# Patient Record
Sex: MALE | Race: BLACK OR AFRICAN AMERICAN | NOT HISPANIC OR LATINO
[De-identification: names, ages, dates, MRNs, and addresses within clinical notes are randomized per-mention and may not be internally consistent; named-entity substitution may affect disease eponyms.]

---

## 2021-07-30 ENCOUNTER — APPOINTMENT (OUTPATIENT)
Dept: ENDOCRINOLOGY | Facility: CLINIC | Age: 46
End: 2021-07-30
Payer: COMMERCIAL

## 2021-07-30 VITALS
TEMPERATURE: 98.3 F | SYSTOLIC BLOOD PRESSURE: 147 MMHG | HEART RATE: 72 BPM | WEIGHT: 250 LBS | HEIGHT: 68.5 IN | DIASTOLIC BLOOD PRESSURE: 91 MMHG | OXYGEN SATURATION: 98 % | BODY MASS INDEX: 37.46 KG/M2

## 2021-07-30 PROCEDURE — 99205 OFFICE O/P NEW HI 60 MIN: CPT

## 2021-08-01 NOTE — REASON FOR VISIT
1. Ice and elevate foot 15 minutes every 1-2 hours as needed for pain and swelling.  2. Follow-up with primary care provider if still difficulty with walking on it in 1 week.     [Initial Evaluation] : an initial evaluation [Pituitary Evaluation/ Disorder] : pituitary evaluation/disorder [FreeTextEntry2] : Dr. Aden

## 2021-08-01 NOTE — PHYSICAL EXAM
[Alert] : alert [Well Nourished] : well nourished [Obese] : obese [No Acute Distress] : no acute distress [Well Developed] : well developed [Normal Sclera/Conjunctiva] : normal sclera/conjunctiva [EOMI] : extra ocular movement intact [Visual Fields Grossly Intact] : visual fields grossly intact [No Proptosis] : no proptosis [Normal Oropharynx] : the oropharynx was normal [No Thyroid Nodules] : no palpable thyroid nodules [Well Healed Scar] : well healed scar [No Respiratory Distress] : no respiratory distress [No Accessory Muscle Use] : no accessory muscle use [Clear to Auscultation] : lungs were clear to auscultation bilaterally [Normal S1, S2] : normal S1 and S2 [Normal Rate] : heart rate was normal [Regular Rhythm] : with a regular rhythm [No Edema] : no peripheral edema [Pedal Pulses Normal] : the pedal pulses are present [Gynecomastia] : gynecomastia present [Normal Bowel Sounds] : normal bowel sounds [Not Tender] : non-tender [Not Distended] : not distended [Soft] : abdomen soft [No Spinal Tenderness] : no spinal tenderness [Spine Straight] : spine straight [No Stigmata of Cushings Syndrome] : no stigmata of Cushings Syndrome [Normal Gait] : normal gait [Normal Strength/Tone] : muscle strength and tone were normal [No Rash] : no rash [Acanthosis Nigricans] : no acanthosis nigricans [Normal Reflexes] : deep tendon reflexes were 2+ and symmetric [No Tremors] : no tremors [Oriented x3] : oriented to person, place, and time [de-identified] : large right sided mass approx 80g [de-identified] : b/l galactorrhea

## 2021-08-01 NOTE — HISTORY OF PRESENT ILLNESS
[FreeTextEntry1] : 46 year old sent in for evaluation of pituitary adenoma. Him and his wife have being trying to conceive for almost 3 years now. He was sent to Urology for evaluation. Prolactin was elevated and he was sent for an MRI. MRI shows 1.9 cm x 2.3 cm x 2.0 cm sellar/pituitary mass. He denies any issues with libido or ED. He has not fathered any children previously by choice. He states he did see an Endocrinologist as a child but does not know why. Reports normal development. Denies any blurry vision or visual disturbances. \par He also reports that in 2000s he has a total thyroidectomy due to a large thyroid in [facility]. Denies any cancer. He denies having any other treatment for his thyroid. He has never been on thyroid hormone. \par He reports that he was given medication for hypertension and does not take it regularly. He takes it only sometimes, does not have the name of the medication. \par \par \par

## 2021-08-01 NOTE — REVIEW OF SYSTEMS
[Negative] : Heme/Lymph [All other systems negative] : All other systems negative [Erectile Dysfunction] : no erectile dysfunction [Decreased Libido] : no decreased libido

## 2021-08-01 NOTE — ASSESSMENT
[Importance of Diet and Exercise] : importance of diet and exercise to improve glycemic control, achieve weight loss and improve cardiovascular health [Weight Loss] : weight loss [FreeTextEntry1] : 1. Pituitary adenoma - On physical exam patient has b/l galactorrhea. Will repeat prolactin as well as prolactin dilution study. Will also check anterior pituitary hormones including FSH/LH, Testosterone, IGF1, TSH and Overnight DST. Pending results will discuss further treatment. We also discussed that he should see Dr. Boothe Neurosx for consultation. He will also see Neuro-ophthalmology for evaluation. \par 2. Thyroid mass - Will check TFTs and thyroid ab. Will also send for thyroid sono. He reports he had a thyroid sono with PCP several years ago will request copy. Pending results will discuss further treatment. \par \par Case discussed with Dr. Munoz \par \par f/u in 4 weeks

## 2021-08-01 NOTE — CONSULT LETTER
[Dear  ___] : Dear  [unfilled], [Consult Letter:] : I had the pleasure of evaluating your patient, [unfilled]. [Please see my note below.] : Please see my note below. [Consult Closing:] : Thank you very much for allowing me to participate in the care of this patient.  If you have any questions, please do not hesitate to contact me. [Sincerely,] : Sincerely, [FreeTextEntry3] : Anila CANOC

## 2021-08-06 ENCOUNTER — NON-APPOINTMENT (OUTPATIENT)
Age: 46
End: 2021-08-06

## 2021-08-06 ENCOUNTER — APPOINTMENT (OUTPATIENT)
Dept: OPHTHALMOLOGY | Facility: CLINIC | Age: 46
End: 2021-08-06
Payer: COMMERCIAL

## 2021-08-06 PROCEDURE — 92083 EXTENDED VISUAL FIELD XM: CPT

## 2021-08-06 PROCEDURE — 92133 CPTRZD OPH DX IMG PST SGM ON: CPT

## 2021-08-06 PROCEDURE — 99204 OFFICE O/P NEW MOD 45 MIN: CPT

## 2021-08-11 ENCOUNTER — APPOINTMENT (OUTPATIENT)
Dept: SPINE | Facility: CLINIC | Age: 46
End: 2021-08-11
Payer: COMMERCIAL

## 2021-08-11 ENCOUNTER — NON-APPOINTMENT (OUTPATIENT)
Age: 46
End: 2021-08-11

## 2021-08-11 VITALS
HEIGHT: 68.5 IN | DIASTOLIC BLOOD PRESSURE: 76 MMHG | HEART RATE: 69 BPM | SYSTOLIC BLOOD PRESSURE: 123 MMHG | WEIGHT: 250 LBS | OXYGEN SATURATION: 96 % | BODY MASS INDEX: 37.46 KG/M2

## 2021-08-11 DIAGNOSIS — Z78.9 OTHER SPECIFIED HEALTH STATUS: ICD-10-CM

## 2021-08-11 PROCEDURE — 99204 OFFICE O/P NEW MOD 45 MIN: CPT

## 2021-08-11 RX ORDER — AMLODIPINE BESYLATE 10 MG/1
10 TABLET ORAL
Refills: 0 | Status: ACTIVE | COMMUNITY

## 2021-08-11 RX ORDER — LOSARTAN POTASSIUM 25 MG/1
25 TABLET, FILM COATED ORAL
Refills: 0 | Status: ACTIVE | COMMUNITY

## 2021-08-11 NOTE — ASSESSMENT
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  MRI shows a pituitary adenoma.  He will go to the lab and obtain blood work as prescribed.  He will then return to Endocrinology and then follow up in the office.  He does have a prolactin level that was reported as 283 from 8/2021 at his urology visit. .  No medications have been ordered to date.  \par  \par \par   \par \par CC;\par Karen Menendez MD \par [address] \par [address] \[place] [address]\par \par Josep Collins MD\par [address] [place] [address]\par [place], [place]

## 2021-08-11 NOTE — PHYSICAL EXAM
[General Appearance - Alert] : alert [General Appearance - In No Acute Distress] : in no acute distress [Oriented To Time, Place, And Person] : oriented to person, place, and time [Impaired Insight] : insight and judgment were intact [Affect] : the affect was normal [Person] : oriented to person [Place] : oriented to place [Time] : oriented to time [Short Term Intact] : short term memory intact [Remote Intact] : remote memory intact [Span Intact] : the attention span was normal [Concentration Intact] : normal concentrating ability [Fluency] : fluency intact [Comprehension] : comprehension intact [Current Events] : adequate knowledge of current events [Past History] : adequate knowledge of personal past history [Vocabulary] : adequate range of vocabulary [Cranial Nerves Optic (II)] : visual acuity intact bilaterally,  pupils equal round and reactive to light [Cranial Nerves Oculomotor (III)] : extraocular motion intact [Cranial Nerves Trigeminal (V)] : facial sensation intact symmetrically [Cranial Nerves Facial (VII)] : face symmetrical [Cranial Nerves Vestibulocochlear (VIII)] : hearing was intact bilaterally [Cranial Nerves Glossopharyngeal (IX)] : tongue and palate midline [Cranial Nerves Accessory (XI - Cranial And Spinal)] : head turning and shoulder shrug symmetric [Cranial Nerves Hypoglossal (XII)] : there was no tongue deviation with protrusion [Motor Tone] : muscle tone was normal in all four extremities [Motor Strength] : muscle strength was normal in all four extremities [No Muscle Atrophy] : normal bulk in all four extremities [Sensation Tactile Decrease] : light touch was intact [Balance] : balance was intact [2+] : Patella left 2+ [No Visual Abnormalities] : no visible abnormailities [No Tenderness to Palpation] : no spine tenderness on palpation [Full ROM] : full ROM [No Pain with ROM] : no pain with motion in any direction [Normal] : normal [Intact] : all reflexes within normal limits bilaterally [Sclera] : the sclera and conjunctiva were normal [PERRL With Normal Accommodation] : pupils were equal in size, round, reactive to light, with normal accommodation [Extraocular Movements] : extraocular movements were intact [Full Visual Field] : full visual field [Outer Ear] : the ears and nose were normal in appearance [Hearing Threshold Finger Rub Not [place]] : hearing was normal [Neck Appearance] : the appearance of the neck was normal [] : no respiratory distress [Abnormal Walk] : normal gait [Skin Color & Pigmentation] : normal skin color and pigmentation [Past-pointing] : there was no past-pointing [Tremor] : no tremor present [___] : absent on the right [___] : absent on the left [L'Hermitte's] : neck flexion did not produce tingling down the spine/arms [Spurling's - Opposite Side] : Negative Spurling's on opposite side [Spurling's Same Side] : Negative Spurling's on same side [Straight-Leg Raise Test - Left] : straight leg raise of the left leg was negative [Straight-Leg Raise Test - Right] : straight leg raise  of the right leg was negative

## 2021-08-11 NOTE — HISTORY OF PRESENT ILLNESS
[< 3 months] : less than 3 months [FreeTextEntry1] : Pituitary tumor  [de-identified] : \par 46 year old male here for a neurosurgical evaluation with infertility and galactorrhea who undergone a  brain MRI .   The MRI shows a pituitary tumor.   He has no vision issues and no HA.  His MRI shows a pituitary tumor.  He had a recent opthalmology evaluation and he reports a negative exam, Dr Vitaliy Hong..  He has seen an endocinologist and has blood work script which has not been completed yet.

## 2021-08-11 NOTE — REASON FOR VISIT
[New Patient Visit] : a new patient visit [Referred By: _________] : Patient was referred by ALISON [Other: _____] : [unfilled] [FreeTextEntry1] : PItuitary tumor

## 2021-08-20 LAB
ACTH SER-ACNC: <1.5 PG/ML
ALBUMIN SERPL ELPH-MCNC: 4.8 G/DL
ALP BLD-CCNC: 89 U/L
ALT SERPL-CCNC: 42 U/L
ANION GAP SERPL CALC-SCNC: 16 MMOL/L
AST SERPL-CCNC: 41 U/L
BASOPHILS # BLD AUTO: 0.03 K/UL
BASOPHILS NFR BLD AUTO: 0.5 %
BILIRUB SERPL-MCNC: 0.3 MG/DL
BUN SERPL-MCNC: 15 MG/DL
CALCIUM SERPL-MCNC: 10 MG/DL
CHLORIDE SERPL-SCNC: 104 MMOL/L
CHOLEST SERPL-MCNC: 217 MG/DL
CO2 SERPL-SCNC: 23 MMOL/L
CORTIS SERPL-MCNC: 0.5 UG/DL
CREAT SERPL-MCNC: 0.86 MG/DL
EOSINOPHIL # BLD AUTO: 0.16 K/UL
EOSINOPHIL NFR BLD AUTO: 2.4 %
ESTIMATED AVERAGE GLUCOSE: 114 MG/DL
FSH SERPL-MCNC: 6.1 IU/L
GLUCOSE SERPL-MCNC: 99 MG/DL
HBA1C MFR BLD HPLC: 5.6 %
HCT VFR BLD CALC: 37.5 %
HDLC SERPL-MCNC: 50 MG/DL
HGB BLD-MCNC: 11.9 G/DL
IGF-1 INTERP: NORMAL
IGF-I BLD-MCNC: 215 NG/ML
IMM GRANULOCYTES NFR BLD AUTO: 0.5 %
LDLC SERPL CALC-MCNC: 157 MG/DL
LH SERPL-ACNC: 5.3 IU/L
LYMPHOCYTES # BLD AUTO: 2.95 K/UL
LYMPHOCYTES NFR BLD AUTO: 44.8 %
MAN DIFF?: NORMAL
MCHC RBC-ENTMCNC: 25.9 PG
MCHC RBC-ENTMCNC: 31.7 GM/DL
MCV RBC AUTO: 81.7 FL
MONOCYTES # BLD AUTO: 0.65 K/UL
MONOCYTES NFR BLD AUTO: 9.9 %
NEUTROPHILS # BLD AUTO: 2.76 K/UL
NEUTROPHILS NFR BLD AUTO: 41.9 %
NONHDLC SERPL-MCNC: 168 MG/DL
PLATELET # BLD AUTO: 308 K/UL
POTASSIUM SERPL-SCNC: 4.5 MMOL/L
PROLACTIN SERPL-MCNC: 519 NG/ML
PROT SERPL-MCNC: 7.5 G/DL
RBC # BLD: 4.59 M/UL
RBC # FLD: 14.6 %
SODIUM SERPL-SCNC: 142 MMOL/L
T3 SERPL-MCNC: 85 NG/DL
T4 FREE SERPL-MCNC: 0.8 NG/DL
T4 SERPL-MCNC: 4.8 UG/DL
TESTOST BND SERPL-MCNC: 3.6 PG/ML
TESTOSTERONE TOTAL S: 154 NG/DL
THYROGLOB AB SERPL-ACNC: <20 IU/ML
THYROPEROXIDASE AB SERPL IA-ACNC: <10 IU/ML
TRIGL SERPL-MCNC: 50 MG/DL
TSH SERPL-ACNC: 4.51 UIU/ML
WBC # FLD AUTO: 6.58 K/UL

## 2021-09-01 ENCOUNTER — APPOINTMENT (OUTPATIENT)
Dept: ENDOCRINOLOGY | Facility: CLINIC | Age: 46
End: 2021-09-01
Payer: COMMERCIAL

## 2021-09-01 VITALS
BODY MASS INDEX: 37.94 KG/M2 | HEART RATE: 70 BPM | DIASTOLIC BLOOD PRESSURE: 89 MMHG | SYSTOLIC BLOOD PRESSURE: 143 MMHG | OXYGEN SATURATION: 98 % | WEIGHT: 253.25 LBS | HEIGHT: 68.5 IN

## 2021-09-01 PROCEDURE — 36415 COLL VENOUS BLD VENIPUNCTURE: CPT

## 2021-09-01 PROCEDURE — 99215 OFFICE O/P EST HI 40 MIN: CPT | Mod: 25

## 2021-09-01 PROCEDURE — 96374 THER/PROPH/DIAG INJ IV PUSH: CPT

## 2021-09-08 NOTE — PHYSICAL EXAM
[Alert] : alert [Well Nourished] : well nourished [Obese] : obese [No Acute Distress] : no acute distress [Well Developed] : well developed [Normal Sclera/Conjunctiva] : normal sclera/conjunctiva [EOMI] : extra ocular movement intact [Visual Fields Grossly Intact] : visual fields grossly intact [No Proptosis] : no proptosis [Normal Oropharynx] : the oropharynx was normal [No Thyroid Nodules] : no palpable thyroid nodules [Well Healed Scar] : well healed scar [No Respiratory Distress] : no respiratory distress [No Accessory Muscle Use] : no accessory muscle use [Clear to Auscultation] : lungs were clear to auscultation bilaterally [Normal S1, S2] : normal S1 and S2 [Normal Rate] : heart rate was normal [Regular Rhythm] : with a regular rhythm [No Edema] : no peripheral edema [Pedal Pulses Normal] : the pedal pulses are present [Gynecomastia] : gynecomastia present [Normal Bowel Sounds] : normal bowel sounds [Not Tender] : non-tender [Not Distended] : not distended [Soft] : abdomen soft [No Spinal Tenderness] : no spinal tenderness [Spine Straight] : spine straight [No Stigmata of Cushings Syndrome] : no stigmata of Cushings Syndrome [Normal Gait] : normal gait [Normal Strength/Tone] : muscle strength and tone were normal [No Rash] : no rash [Normal Reflexes] : deep tendon reflexes were 2+ and symmetric [No Tremors] : no tremors [Oriented x3] : oriented to person, place, and time [Acanthosis Nigricans] : no acanthosis nigricans [de-identified] : large right sided mass approx 80g [de-identified] : b/l galactorrhea

## 2021-09-08 NOTE — ASSESSMENT
[Importance of Diet and Exercise] : importance of diet and exercise to improve glycemic control, achieve weight loss and improve cardiovascular health [Weight Loss] : weight loss [FreeTextEntry1] : 1. Pituitary adenoma - On physical exam patient has b/l galactorrhea. Will repeat prolactin as well as prolactin dilution study now that he is on medication. ACTH stim test done today. \par 2. Thyroid mass - Pending ACTH stim test will start patient on Levothyroxine. FNA of nodules was benign. Will consider sendng for head and neck surgical consult. \par \par Today the patient received Cortrosyn 0.25 mg via IV push. Labs were drawn for Cortisol at time 0 ,30 min and 60 min. Baseline ACTH was also measured. He felt well during this time and denies any symptoms or outward effects of the stimulation.\par We will call him with the results. \par \par Case discussed with Dr. Munoz \par \par f/u in 4-6 weeks

## 2021-09-08 NOTE — HISTORY OF PRESENT ILLNESS
[FreeTextEntry1] : 46 year old sent in for evaluation of pituitary adenoma. Him and his wife have being trying to conceive for almost 3 years now. He was sent to Urology for evaluation. Prolactin was elevated and he was sent for an MRI. MRI shows 1.9 cm x 2.3 cm x 2.0 cm sellar/pituitary mass. He denies any issues with libido or ED. He has not fathered any children previously by choice. He states he did see an Endocrinologist as a child but does not know why. Reports normal development. Denies any blurry vision or visual disturbances. \par He also reports that in 2000s he has a total thyroidectomy due to a large thyroid in [facility]. Denies any cancer. He denies having any other treatment for his thyroid. He has never been on thyroid hormone. \par He reports that he was given medication for hypertension and does not take it regularly. He takes it only sometimes, does not have the name of the medication. \par \par \par 9/1/21\par Prolactin was 519. He was started on Cabergoline about 2 weeks ago. He is taking 1/2 tab twice weekly. TSH was mildly elevated will consider starting Levothyroxine.\par Today he is here for an ACTH stim test to r/o AI. \par He did see Dr. Espinoza and exam was wnl. Also seen by Neurosx Dr. Boothe, who is in agreement with patient being on cabergoline. \par Thyroid sono showed b/l large thyroid nodules. The largest of which measures 4.3cm, 3.9cm and 1.6. Pt had FNA of these nodules.

## 2021-09-09 LAB
ACTH STIM ACTH BASELINE: 21.6 PG/ML
ACTH STIM CORTISOL 30 MIN: 20.3 UG/DL
ACTH STIM CORTISOL BASELINE: 6.7 UG/DL
ACTH STIMULATION : CORTISOL 60 MIN: 22.7 UG/DL
PROLACTIN SERPL-MCNC: 31.2 NG/ML
PROLACTIN SERPL-MCNC: 31.5 NG/ML

## 2021-09-10 ENCOUNTER — NON-APPOINTMENT (OUTPATIENT)
Age: 46
End: 2021-09-10

## 2021-09-28 ENCOUNTER — APPOINTMENT (OUTPATIENT)
Dept: SURGERY | Facility: CLINIC | Age: 46
End: 2021-09-28
Payer: COMMERCIAL

## 2021-09-28 VITALS
HEART RATE: 69 BPM | WEIGHT: 235 LBS | SYSTOLIC BLOOD PRESSURE: 133 MMHG | DIASTOLIC BLOOD PRESSURE: 80 MMHG | BODY MASS INDEX: 35.61 KG/M2 | HEIGHT: 68 IN

## 2021-09-28 PROCEDURE — 99204 OFFICE O/P NEW MOD 45 MIN: CPT

## 2021-09-28 NOTE — ASSESSMENT
[FreeTextEntry1] : requested CT to evaluate vocal cords, tracheal compression, substernal extension. to call next week for results. patient has been given the opportunity to ask questions, and all of the patient's questions have been answered to their satisfaction\par

## 2021-09-28 NOTE — HISTORY OF PRESENT ILLNESS
[de-identified] : Pt c/o Thyroid nodules.  s/p partial thyroidectomy many years ago in [facility] but is uncertain of extent of surgery.  denies dysphagia, hoarseness, SOB or RT exposure\par sonogram:  Right 3.9 cm and 1.1 cm, Left 4.3 cm and 1.6 cm nodules \par FNA right and left dominant nodules (Acupath) benign\par normal TFT's\par I have reviewed all old and new data and available images.\par

## 2021-09-28 NOTE — CONSULT LETTER
[Dear  ___] : Dear ~RENE, [Consult Letter:] : I had the pleasure of evaluating your patient, [unfilled]. [Please see my note below.] : Please see my note below. [Consult Closing:] : Thank you very much for allowing me to participate in the care of this patient.  If you have any questions, please do not hesitate to contact me. [Sincerely,] : Sincerely, [FreeTextEntry2] : XAVIER Zhao, Dr. Josep Collins [FreeTextEntry3] : Parth Serrano MD, FACS\par System Director, Endocrine Surgery\par [facility]\par Assistant Clinical Professor of Surgery\par [employer] [facility]  [DrJasmyn  ___] : Dr. ROSAS

## 2021-09-28 NOTE — PHYSICAL EXAM
[de-identified] : long well healed scar low transverse  midline [de-identified] : bilateral 4 cm thyroid nodules, well circumscribed and mobile [Midline] : located in midline position [Normal] : orientation to person, place, and time: normal [de-identified] : pt could not cooperate for indirect  nor fiberoptic laryngoscopy

## 2021-10-06 ENCOUNTER — APPOINTMENT (OUTPATIENT)
Dept: CT IMAGING | Facility: IMAGING CENTER | Age: 46
End: 2021-10-06
Payer: COMMERCIAL

## 2021-10-06 ENCOUNTER — OUTPATIENT (OUTPATIENT)
Dept: OUTPATIENT SERVICES | Facility: HOSPITAL | Age: 46
LOS: 1 days | End: 2021-10-06
Payer: COMMERCIAL

## 2021-10-06 DIAGNOSIS — E04.9 NONTOXIC GOITER, UNSPECIFIED: ICD-10-CM

## 2021-10-06 PROCEDURE — 70491 CT SOFT TISSUE NECK W/DYE: CPT

## 2021-10-06 PROCEDURE — 70491 CT SOFT TISSUE NECK W/DYE: CPT | Mod: 26

## 2021-10-13 ENCOUNTER — RESULT CHARGE (OUTPATIENT)
Age: 46
End: 2021-10-13

## 2021-10-13 ENCOUNTER — NON-APPOINTMENT (OUTPATIENT)
Age: 46
End: 2021-10-13

## 2021-10-13 ENCOUNTER — APPOINTMENT (OUTPATIENT)
Dept: ENDOCRINOLOGY | Facility: CLINIC | Age: 46
End: 2021-10-13
Payer: COMMERCIAL

## 2021-10-13 VITALS
WEIGHT: 240 LBS | HEIGHT: 68 IN | OXYGEN SATURATION: 97 % | BODY MASS INDEX: 36.37 KG/M2 | DIASTOLIC BLOOD PRESSURE: 75 MMHG | HEART RATE: 69 BPM | SYSTOLIC BLOOD PRESSURE: 123 MMHG

## 2021-10-13 LAB — GLUCOSE BLDC GLUCOMTR-MCNC: 90

## 2021-10-13 PROCEDURE — 99214 OFFICE O/P EST MOD 30 MIN: CPT

## 2021-10-13 NOTE — PHYSICAL EXAM
[Alert] : alert [Well Nourished] : well nourished [Obese] : obese [No Acute Distress] : no acute distress [Well Developed] : well developed [Normal Sclera/Conjunctiva] : normal sclera/conjunctiva [EOMI] : extra ocular movement intact [Visual Fields Grossly Intact] : visual fields grossly intact [No Proptosis] : no proptosis [Normal Oropharynx] : the oropharynx was normal [No Thyroid Nodules] : no palpable thyroid nodules [Well Healed Scar] : well healed scar [No Respiratory Distress] : no respiratory distress [No Accessory Muscle Use] : no accessory muscle use [Clear to Auscultation] : lungs were clear to auscultation bilaterally [Normal S1, S2] : normal S1 and S2 [Normal Rate] : heart rate was normal [Regular Rhythm] : with a regular rhythm [No Edema] : no peripheral edema [Pedal Pulses Normal] : the pedal pulses are present [Gynecomastia] : gynecomastia present [Normal Bowel Sounds] : normal bowel sounds [Not Tender] : non-tender [Not Distended] : not distended [Soft] : abdomen soft [No Spinal Tenderness] : no spinal tenderness [Spine Straight] : spine straight [No Stigmata of Cushings Syndrome] : no stigmata of Cushings Syndrome [Normal Gait] : normal gait [Normal Strength/Tone] : muscle strength and tone were normal [No Rash] : no rash [Normal Reflexes] : deep tendon reflexes were 2+ and symmetric [No Tremors] : no tremors [Oriented x3] : oriented to person, place, and time [Acanthosis Nigricans] : no acanthosis nigricans [de-identified] : large right sided mass approx 80g

## 2021-10-13 NOTE — ASSESSMENT
[Importance of Diet and Exercise] : importance of diet and exercise to improve glycemic control, achieve weight loss and improve cardiovascular health [Weight Loss] : weight loss [FreeTextEntry1] : 1. Pituitary adenoma - ACTH stim was wnl. He will continue to take cabergoline. Will check Prolactin and prolactin dilution study. Did not palpate chest due to checking his prolactin. \par 2. Thyroid mass/elevated tsh - He saw Dr. Serrano and had MRI, which showed minimal tracheal compression and vocal cord abnormality. Per Dr. Serrano's recommendation no surgical intervention needed at this time. Will continue active surveillance. Will check TFTs since he has been on cabergoline. \par 3. Htn - BP stable. He should take medication daily..\par \par Patient to complete labs, will call with the results. \par \par f/u with Dr. Munoz in 2 months

## 2021-10-13 NOTE — HISTORY OF PRESENT ILLNESS
[FreeTextEntry1] : 46 year old sent in for evaluation of pituitary adenoma. Him and his wife have being trying to conceive for almost 3 years now. He was sent to Urology for evaluation. Prolactin was elevated and he was sent for an MRI. MRI shows 1.9 cm x 2.3 cm x 2.0 cm sellar/pituitary mass. He has not fathered any children previously by choice. Reports normal development. Denies any blurry vision or visual disturbances. He did see Dr. Espinoza and exam was wnl. Also seen by Neurosx Dr. Boothe, no surgical intervention at this time. He is on cabergoline and denies any side effects. He feels that since starting it his Libido has increased tremendously. He also feels more energetic and "better". He also lost 13 lbs since his last visit. \par He also reports that in 2000s he has a total thyroidectomy due to a large thyroid in [facility]. Denies any cancer. He denies having any other treatment for his thyroid. He has never been on thyroid hormone. Recent thyroid sono showed multiple nodules, the largest of which measures 4.3cm, 3.9cm and 1.6. FNA showed benign pathology. \par He reports that he was given medication for hypertension and does not take it regularly. He feels since starting cabergoline his BP has improved as well. He also has less headaches.

## 2021-10-18 LAB
ALBUMIN SERPL ELPH-MCNC: 4.6 G/DL
ALP BLD-CCNC: 83 U/L
ALT SERPL-CCNC: 24 U/L
ANION GAP SERPL CALC-SCNC: 14 MMOL/L
AST SERPL-CCNC: 22 U/L
BILIRUB SERPL-MCNC: 0.5 MG/DL
BUN SERPL-MCNC: 14 MG/DL
CALCIUM SERPL-MCNC: 9.9 MG/DL
CHLORIDE SERPL-SCNC: 102 MMOL/L
CO2 SERPL-SCNC: 26 MMOL/L
CREAT SERPL-MCNC: 0.76 MG/DL
FSH SERPL-MCNC: 8.2 IU/L
GLUCOSE SERPL-MCNC: 80 MG/DL
LH SERPL-ACNC: 6.4 IU/L
POTASSIUM SERPL-SCNC: 4.6 MMOL/L
PROLACTIN SERPL-MCNC: 1.19 NG/ML
PROLACTIN SERPL-MCNC: 1.23 NG/ML
PROT SERPL-MCNC: 7.3 G/DL
SODIUM SERPL-SCNC: 142 MMOL/L
T4 FREE SERPL-MCNC: 1.2 NG/DL
T4 SERPL-MCNC: 6.5 UG/DL
TESTOST BND SERPL-MCNC: 4.4 PG/ML
TESTOSTERONE TOTAL S: 319 NG/DL
TSH SERPL-ACNC: 2.07 UIU/ML

## 2021-11-22 ENCOUNTER — APPOINTMENT (OUTPATIENT)
Dept: SPINE | Facility: CLINIC | Age: 46
End: 2021-11-22
Payer: COMMERCIAL

## 2021-11-22 VITALS
SYSTOLIC BLOOD PRESSURE: 119 MMHG | WEIGHT: 240 LBS | DIASTOLIC BLOOD PRESSURE: 74 MMHG | OXYGEN SATURATION: 97 % | HEIGHT: 68 IN | BODY MASS INDEX: 36.37 KG/M2 | HEART RATE: 75 BPM

## 2021-11-22 PROCEDURE — 99213 OFFICE O/P EST LOW 20 MIN: CPT

## 2021-12-07 ENCOUNTER — APPOINTMENT (OUTPATIENT)
Dept: ENDOCRINOLOGY | Facility: CLINIC | Age: 46
End: 2021-12-07
Payer: COMMERCIAL

## 2021-12-07 VITALS
BODY MASS INDEX: 36 KG/M2 | WEIGHT: 237.5 LBS | HEIGHT: 68 IN | SYSTOLIC BLOOD PRESSURE: 125 MMHG | OXYGEN SATURATION: 96 % | DIASTOLIC BLOOD PRESSURE: 73 MMHG | HEART RATE: 81 BPM

## 2021-12-07 PROCEDURE — 99214 OFFICE O/P EST MOD 30 MIN: CPT

## 2021-12-07 NOTE — ASSESSMENT
[FreeTextEntry1] : 1) PItuitary macroadenoma MRI stable in size repeat one year as per neurosurgeon. Recommend f/u with neuroophthalmologist this summer. Good prolactin response, continue cabergoline. patient happy wife now pregnant 5 weeks. \par 2) Goiter - check TFTs again and has f/u sono to do in the spring.  \par to do labs

## 2021-12-07 NOTE — HISTORY OF PRESENT ILLNESS
[FreeTextEntry1] : Patient with Pituitary macroadenoma; elevated prolactin good response to cabergoline; currently wife pregnant due in August. Recent MRI despite lowering of Prolactin no change in size. Did meet with neurosurgeon, Saw neuroophthalmologist in august. \par H/O MNG said had surgery many years ago in [facility] despite fairly enlarged thyroid now. Had recent negative FNA. denies any local neck sxs. Had galactorrhea which has resolved. Improved libido.

## 2021-12-07 NOTE — PHYSICAL EXAM
[Alert] : alert [Well Nourished] : well nourished [No Acute Distress] : no acute distress [Well Developed] : well developed [Normal Sclera/Conjunctiva] : normal sclera/conjunctiva [EOMI] : extra ocular movement intact [No Proptosis] : no proptosis [Normal Oropharynx] : the oropharynx was normal [No Respiratory Distress] : no respiratory distress [No Accessory Muscle Use] : no accessory muscle use [Clear to Auscultation] : lungs were clear to auscultation bilaterally [Normal S1, S2] : normal S1 and S2 [Normal Rate] : heart rate was normal [Regular Rhythm] : with a regular rhythm [No Edema] : no peripheral edema [Pedal Pulses Normal] : the pedal pulses are present [Normal Bowel Sounds] : normal bowel sounds [Not Tender] : non-tender [Not Distended] : not distended [Soft] : abdomen soft [Normal Anterior Cervical Nodes] : no anterior cervical lymphadenopathy [Normal Posterior Cervical Nodes] : no posterior cervical lymphadenopathy [No Spinal Tenderness] : no spinal tenderness [Spine Straight] : spine straight [No Stigmata of Cushings Syndrome] : no stigmata of Cushings Syndrome [Normal Gait] : normal gait [Normal Strength/Tone] : muscle strength and tone were normal [No Rash] : no rash [Normal Reflexes] : deep tendon reflexes were 2+ and symmetric [No Tremors] : no tremors [Oriented x3] : oriented to person, place, and time [Acanthosis Nigricans] : no acanthosis nigricans [de-identified] : thyroid enlarged R>left 3 times normal size.

## 2022-01-24 ENCOUNTER — RX RENEWAL (OUTPATIENT)
Age: 47
End: 2022-01-24

## 2022-03-08 ENCOUNTER — APPOINTMENT (OUTPATIENT)
Dept: ENDOCRINOLOGY | Facility: CLINIC | Age: 47
End: 2022-03-08
Payer: COMMERCIAL

## 2022-03-08 VITALS
HEART RATE: 68 BPM | BODY MASS INDEX: 35.16 KG/M2 | DIASTOLIC BLOOD PRESSURE: 78 MMHG | WEIGHT: 232 LBS | SYSTOLIC BLOOD PRESSURE: 134 MMHG | OXYGEN SATURATION: 97 % | HEIGHT: 68 IN

## 2022-03-08 DIAGNOSIS — Z87.59 PERSONAL HISTORY OF OTHER COMPLICATIONS OF PREGNANCY, CHILDBIRTH AND THE PUERPERIUM: ICD-10-CM

## 2022-03-08 PROCEDURE — 36415 COLL VENOUS BLD VENIPUNCTURE: CPT

## 2022-03-08 PROCEDURE — 99214 OFFICE O/P EST MOD 30 MIN: CPT | Mod: 25

## 2022-03-08 NOTE — HISTORY OF PRESENT ILLNESS
[FreeTextEntry1] : 47 year old presents for follow up of pituitary microadenoma. Patient found to have pituitary microadenoma when trying to conceive. Has since been started on Cabergoline, with good response in prolactin. Since being on carbergoline libido has improved. His wife is currently  Seen by Dr. Boothe and conservative management recommended. Denies any blurry vision or visual disturbances. He did see Dr. Espinoza and exam was wnl. He feels that since starting it his Libido has increased tremendously. He also feels more energetic and "better". He also lost 13 lbs since his last visit. \par He also reports that in 2000s he has a total thyroidectomy due to a large thyroid in [facility]. Denies any cancer. He denies having any other treatment for his thyroid. He has never been on thyroid hormone. Recent thyroid sono showed multiple nodules, the largest of which measures 4.3cm, 3.9cm and 1.6. FNA showed benign pathology. Seeing Dr. Serrano. \par He reports that he was given medication for hypertension and does not take it regularly. He feels since starting cabergoline his BP has improved as well. He also has less headaches.

## 2022-03-08 NOTE — PHYSICAL EXAM
[Alert] : alert [Well Nourished] : well nourished [Obese] : obese [No Acute Distress] : no acute distress [Well Developed] : well developed [Normal Sclera/Conjunctiva] : normal sclera/conjunctiva [EOMI] : extra ocular movement intact [Visual Fields Grossly Intact] : visual fields grossly intact [No Proptosis] : no proptosis [No Thyroid Nodules] : no palpable thyroid nodules [Well Healed Scar] : well healed scar [No Respiratory Distress] : no respiratory distress [No Accessory Muscle Use] : no accessory muscle use [Clear to Auscultation] : lungs were clear to auscultation bilaterally [Normal S1, S2] : normal S1 and S2 [Normal Rate] : heart rate was normal [Regular Rhythm] : with a regular rhythm [No Edema] : no peripheral edema [Pedal Pulses Normal] : the pedal pulses are present [Gynecomastia] : gynecomastia present [Normal Bowel Sounds] : normal bowel sounds [Not Tender] : non-tender [Not Distended] : not distended [Soft] : abdomen soft [No Spinal Tenderness] : no spinal tenderness [Spine Straight] : spine straight [No Stigmata of Cushings Syndrome] : no stigmata of Cushings Syndrome [Normal Gait] : normal gait [Normal Strength/Tone] : muscle strength and tone were normal [No Rash] : no rash [Normal Reflexes] : deep tendon reflexes were 2+ and symmetric [No Tremors] : no tremors [Oriented x3] : oriented to person, place, and time [No Galactorrhea] : no galactorrhea [Acanthosis Nigricans] : no acanthosis nigricans [de-identified] : large right sided mass approx 80g

## 2022-03-08 NOTE — ASSESSMENT
[Importance of Diet and Exercise] : importance of diet and exercise to improve glycemic control, achieve weight loss and improve cardiovascular health [Weight Loss] : weight loss [FreeTextEntry1] : 1. Pituitary adenoma - Remain on Carbergoline. Will check labs today. ACTH Stim previously wnl. \par 2. Thyroid mass/elevated tsh - He saw Dr. Serrano and had MRI, which showed minimal tracheal compression and vocal cord abnormality. Per Dr. Serrano's recommendation no surgical intervention needed at this time. Will continue active surveillance. Will check TFTs since he has been on cabergoline. \par 3. Htn - BP stable. He should take medication daily..\par \par Fasting labs done today in the office, will call with results. \par f/u in 3 months

## 2022-03-08 NOTE — REASON FOR VISIT
[Follow - Up] : a follow-up visit [Pituitary Evaluation/ Disorder] : pituitary evaluation/disorder [Thyroid nodule/ MNG] : thyroid nodule/ MNG [Weight Management/Obesity] : weight management/obesity

## 2022-03-17 LAB
ALBUMIN SERPL ELPH-MCNC: 4.4 G/DL
ALP BLD-CCNC: 89 U/L
ALT SERPL-CCNC: 19 U/L
ANION GAP SERPL CALC-SCNC: 11 MMOL/L
AST SERPL-CCNC: 16 U/L
BASOPHILS # BLD AUTO: 0.02 K/UL
BASOPHILS NFR BLD AUTO: 0.3 %
BILIRUB SERPL-MCNC: 0.3 MG/DL
BUN SERPL-MCNC: 16 MG/DL
CALCIUM SERPL-MCNC: 9.9 MG/DL
CHLORIDE SERPL-SCNC: 106 MMOL/L
CHOLEST SERPL-MCNC: 175 MG/DL
CO2 SERPL-SCNC: 25 MMOL/L
CORTIS SERPL-MCNC: 10.7 UG/DL
CREAT SERPL-MCNC: 1.09 MG/DL
EGFR: 85 ML/MIN/1.73M2
EOSINOPHIL # BLD AUTO: 0.17 K/UL
EOSINOPHIL NFR BLD AUTO: 3 %
ESTIMATED AVERAGE GLUCOSE: 114 MG/DL
FSH SERPL-MCNC: 6.8 IU/L
GLUCOSE SERPL-MCNC: 96 MG/DL
HBA1C MFR BLD HPLC: 5.6 %
HCT VFR BLD CALC: 41 %
HDLC SERPL-MCNC: 58 MG/DL
HGB BLD-MCNC: 12.6 G/DL
IMM GRANULOCYTES NFR BLD AUTO: 0.2 %
LDLC SERPL CALC-MCNC: 106 MG/DL
LH SERPL-ACNC: 9.2 IU/L
LYMPHOCYTES # BLD AUTO: 2.47 K/UL
LYMPHOCYTES NFR BLD AUTO: 43.1 %
MAN DIFF?: NORMAL
MCHC RBC-ENTMCNC: 24.8 PG
MCHC RBC-ENTMCNC: 30.7 GM/DL
MCV RBC AUTO: 80.6 FL
MONOCYTES # BLD AUTO: 0.42 K/UL
MONOCYTES NFR BLD AUTO: 7.3 %
NEUTROPHILS # BLD AUTO: 2.64 K/UL
NEUTROPHILS NFR BLD AUTO: 46.1 %
NONHDLC SERPL-MCNC: 116 MG/DL
PLATELET # BLD AUTO: 297 K/UL
POTASSIUM SERPL-SCNC: 4.5 MMOL/L
PROLACTIN SERPL-MCNC: 0.9 NG/ML
PROLACTIN SERPL-MCNC: 0.9 NG/ML
PROLACTIN SERPL-MCNC: 1.3 NG/ML
PROT SERPL-MCNC: 7.2 G/DL
RBC # BLD: 5.09 M/UL
RBC # FLD: 15.9 %
SODIUM SERPL-SCNC: 143 MMOL/L
T4 FREE SERPL-MCNC: 1.1 NG/DL
T4 SERPL-MCNC: 5.7 UG/DL
TESTOST FREE SERPL-MCNC: 8.6 PG/ML
TESTOST SERPL-MCNC: 472 NG/DL
TRIGL SERPL-MCNC: 50 MG/DL
TSH SERPL-ACNC: 3.66 UIU/ML
WBC # FLD AUTO: 5.73 K/UL

## 2022-06-08 ENCOUNTER — APPOINTMENT (OUTPATIENT)
Dept: ENDOCRINOLOGY | Facility: CLINIC | Age: 47
End: 2022-06-08
Payer: COMMERCIAL

## 2022-06-08 VITALS
HEART RATE: 72 BPM | HEIGHT: 68 IN | BODY MASS INDEX: 34.9 KG/M2 | OXYGEN SATURATION: 99 % | WEIGHT: 230.25 LBS | SYSTOLIC BLOOD PRESSURE: 148 MMHG | DIASTOLIC BLOOD PRESSURE: 82 MMHG

## 2022-06-08 DIAGNOSIS — I10 ESSENTIAL (PRIMARY) HYPERTENSION: ICD-10-CM

## 2022-06-08 LAB — GLUCOSE BLDC GLUCOMTR-MCNC: 88

## 2022-06-08 PROCEDURE — 99213 OFFICE O/P EST LOW 20 MIN: CPT | Mod: 25

## 2022-06-08 PROCEDURE — 82962 GLUCOSE BLOOD TEST: CPT

## 2022-06-08 NOTE — HISTORY OF PRESENT ILLNESS
[FreeTextEntry1] : 47 year old presents for follow up of pituitary microadenoma. Patient found to have pituitary microadenoma when trying to conceive with his SO. Has since been started on Cabergoline, with good response in prolactin. Since being on carbergoline libido has improved. His wife is currently pregnant. Seen by Dr. Boothe and conservative management recommended. Denies any blurry vision or visual disturbances. He did see Dr. Espinoza and exam was wnl. He feels that since starting it his Libido has increased tremendously. He also feels more energetic and "better". Weight stable since his last visit. He was away this last month and ran out of Cabergoline and has not taken it in about 1 month. \par He also reports that in 2000s he had a total thyroidectomy due to a large thyroid in [facility]. Denies any cancer. He denies having any other treatment for his thyroid. He has never been on thyroid hormone. Recent thyroid sono showed multiple nodules, the largest of which measures 4.3cm, 3.9cm and 1.6. FNA showed benign pathology. Seeing Dr. Serrano. \par He reports that he was given medication for hypertension and does not take it regularly. He feels since starting cabergoline his BP has improved as well. He also has less headaches.

## 2022-06-08 NOTE — ASSESSMENT
[Importance of Diet and Exercise] : importance of diet and exercise to improve glycemic control, achieve weight loss and improve cardiovascular health [Weight Loss] : weight loss [FreeTextEntry1] : 1. Pituitary adenoma - Resume Cabergoline. Discussed importance of compliance with medication. Will check prolactin in 1 month\par 2. Thyroid mass/elevated tsh - He saw Dr. Serrano and had MRI, which showed minimal tracheal compression and vocal cord abnormality. Per Dr. Serrano's recommendation no surgical intervention needed at this time. Will continue active surveillance. Recent Thyroid sono 3/2022 is stable. \par 3. Htn - BP stable. He should take medication daily..\par \par Patient to complete labs in 1 month, will call with the results. \par \par f/u in 4 months

## 2022-06-08 NOTE — PHYSICAL EXAM
[Alert] : alert [Well Nourished] : well nourished [Obese] : obese [No Acute Distress] : no acute distress [Well Developed] : well developed [Normal Sclera/Conjunctiva] : normal sclera/conjunctiva [EOMI] : extra ocular movement intact [Visual Fields Grossly Intact] : visual fields grossly intact [No Proptosis] : no proptosis [No Thyroid Nodules] : no palpable thyroid nodules [Well Healed Scar] : well healed scar [No Respiratory Distress] : no respiratory distress [No Accessory Muscle Use] : no accessory muscle use [Clear to Auscultation] : lungs were clear to auscultation bilaterally [Normal S1, S2] : normal S1 and S2 [Normal Rate] : heart rate was normal [Regular Rhythm] : with a regular rhythm [No Edema] : no peripheral edema [Pedal Pulses Normal] : the pedal pulses are present [Gynecomastia] : gynecomastia present [No Galactorrhea] : no galactorrhea [Normal Bowel Sounds] : normal bowel sounds [Not Tender] : non-tender [Not Distended] : not distended [Soft] : abdomen soft [No Spinal Tenderness] : no spinal tenderness [Spine Straight] : spine straight [No Stigmata of Cushings Syndrome] : no stigmata of Cushings Syndrome [Normal Gait] : normal gait [Normal Strength/Tone] : muscle strength and tone were normal [No Rash] : no rash [Normal Reflexes] : deep tendon reflexes were 2+ and symmetric [No Tremors] : no tremors [Oriented x3] : oriented to person, place, and time [Acanthosis Nigricans] : no acanthosis nigricans [de-identified] : large right sided mass approx 80g

## 2022-07-03 ENCOUNTER — LABORATORY RESULT (OUTPATIENT)
Age: 47
End: 2022-07-03

## 2022-07-14 LAB
ALBUMIN SERPL ELPH-MCNC: 4.5 G/DL
ALP BLD-CCNC: 78 U/L
ALT SERPL-CCNC: 15 U/L
ANION GAP SERPL CALC-SCNC: 14 MMOL/L
AST SERPL-CCNC: 17 U/L
BILIRUB SERPL-MCNC: 0.6 MG/DL
BUN SERPL-MCNC: 12 MG/DL
CALCIUM SERPL-MCNC: 9.7 MG/DL
CHLORIDE SERPL-SCNC: 104 MMOL/L
CHOLEST SERPL-MCNC: 177 MG/DL
CO2 SERPL-SCNC: 24 MMOL/L
CREAT SERPL-MCNC: 0.88 MG/DL
EGFR: 107 ML/MIN/1.73M2
GLUCOSE SERPL-MCNC: 82 MG/DL
HDLC SERPL-MCNC: 66 MG/DL
LDLC SERPL CALC-MCNC: 102 MG/DL
NONHDLC SERPL-MCNC: 111 MG/DL
POTASSIUM SERPL-SCNC: 4.4 MMOL/L
PROLACTIN SERPL-MCNC: 1.2 NG/ML
PROLACTIN SERPL-MCNC: 1.7 NG/ML
PROT SERPL-MCNC: 7.3 G/DL
SODIUM SERPL-SCNC: 142 MMOL/L
T4 FREE SERPL-MCNC: 0.9 NG/DL
T4 SERPL-MCNC: 4.7 UG/DL
TRIGL SERPL-MCNC: 46 MG/DL
TSH SERPL-ACNC: 5.54 UIU/ML

## 2022-07-25 ENCOUNTER — APPOINTMENT (OUTPATIENT)
Dept: SPINE | Facility: CLINIC | Age: 47
End: 2022-07-25

## 2022-08-15 ENCOUNTER — APPOINTMENT (OUTPATIENT)
Dept: SPINE | Facility: CLINIC | Age: 47
End: 2022-08-15

## 2022-08-15 VITALS
HEIGHT: 66 IN | SYSTOLIC BLOOD PRESSURE: 132 MMHG | DIASTOLIC BLOOD PRESSURE: 76 MMHG | OXYGEN SATURATION: 97 % | WEIGHT: 240 LBS | HEART RATE: 71 BPM | BODY MASS INDEX: 38.57 KG/M2

## 2022-08-15 PROCEDURE — 99213 OFFICE O/P EST LOW 20 MIN: CPT

## 2022-09-01 ENCOUNTER — APPOINTMENT (OUTPATIENT)
Dept: SURGERY | Facility: CLINIC | Age: 47
End: 2022-09-01

## 2022-09-01 PROCEDURE — 99214 OFFICE O/P EST MOD 30 MIN: CPT | Mod: 25

## 2022-09-01 PROCEDURE — 36415 COLL VENOUS BLD VENIPUNCTURE: CPT

## 2022-09-01 NOTE — ASSESSMENT
[FreeTextEntry1] : will observe. bloods drawn.  to call next week for results. CT next visit. patient has been given the opportunity to ask questions, and all of the patient's questions have been answered to their satisfaction.  to return earlier if any change.

## 2022-09-01 NOTE — PROCEDURE
[None] : none [Normal] : normal base of the tongue [Mass ___ cm] : no masses on the base of the tongue [de-identified] : paretic right vocal cord

## 2022-09-01 NOTE — HISTORY OF PRESENT ILLNESS
[de-identified] : prior evaluation of thyroid nodules. cytologically benign. denies dysphagia, hoarseness or new lesions.  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 recent sonogram stable. no changes medically since last visit. I have reviewed all old and new data and available images.

## 2022-09-01 NOTE — PHYSICAL EXAM
[de-identified] : long well healed scar low transverse  midline [de-identified] : bilateral 4 cm thyroid nodules, well circumscribed and mobile [Laryngoscopy Performed] : laryngoscopy was performed, see procedure section for findings [Midline] : located in midline position [Normal] : orientation to person, place, and time: normal

## 2022-09-03 LAB
24R-OH-CALCIDIOL SERPL-MCNC: 52.8 PG/ML
CALCIUM SERPL-MCNC: 9.8 MG/DL
CALCIUM SERPL-MCNC: 9.8 MG/DL
PARATHYROID HORMONE INTACT: 28 PG/ML
T3 SERPL-MCNC: 87 NG/DL
T4 FREE SERPL-MCNC: 0.9 NG/DL
THYROGLOB AB SERPL-ACNC: <20 IU/ML
THYROPEROXIDASE AB SERPL IA-ACNC: <10 IU/ML
TSH SERPL-ACNC: 2.14 UIU/ML

## 2022-09-06 ENCOUNTER — NON-APPOINTMENT (OUTPATIENT)
Age: 47
End: 2022-09-06

## 2022-09-26 LAB
PROLACTIN SERPL-MCNC: 1.7 NG/ML
PROLACTIN SERPL-MCNC: 2 NG/ML

## 2022-10-05 ENCOUNTER — APPOINTMENT (OUTPATIENT)
Dept: ENDOCRINOLOGY | Facility: CLINIC | Age: 47
End: 2022-10-05

## 2022-10-05 VITALS
HEART RATE: 75 BPM | HEIGHT: 66 IN | WEIGHT: 224.38 LBS | BODY MASS INDEX: 36.06 KG/M2 | DIASTOLIC BLOOD PRESSURE: 92 MMHG | SYSTOLIC BLOOD PRESSURE: 145 MMHG | OXYGEN SATURATION: 96 %

## 2022-10-05 PROCEDURE — 99212 OFFICE O/P EST SF 10 MIN: CPT

## 2022-10-06 NOTE — ASSESSMENT
[FreeTextEntry1] : History of pituitary microadenoma and hyperprolactinemia, likely prolactinoma\par Has been on cabergoline since past year\par Reviewed blood work from September 22, 2022.  Prolactin on lower end\par Continue cabergoline 0.25 mg once weekly\par Discussed with patient that if prolactin level is low at next visit, will consider to do a trial off cabergoline\par Clinically patient has no complaints today, possibly thinking about future children, has 1 child already.\par \par History of thyroid nodules, history of benign FNA \par Following with ENT regularly, recommended to repeat CT scan at next visit\par Denies compressive symptoms today, biochemically euthyroid, not on thyroid medication\par Repeat thyroid sonogram next year\par \par Answered all questions today; patient verbalized understanding of the above\par RTC in 3 months\par

## 2022-10-06 NOTE — PHYSICAL EXAM
[Alert] : alert [No Acute Distress] : no acute distress [Well Developed] : well developed [Normal Voice/Communication] : normal voice communication [Normal Sclera/Conjunctiva] : normal sclera/conjunctiva [EOMI] : extra ocular movement intact [No Proptosis] : no proptosis [No Lid Lag] : no lid lag [Normal Hearing] : hearing was normal [No LAD] : no lymphadenopathy [Supple] : the neck was supple [No Stigmata of Cushings Syndrome] : no stigmata of Cushings Syndrome [Normal Gait] : normal gait [No Clubbing, Cyanosis] : no clubbing  or cyanosis of the fingernails [No Involuntary Movements] : no involuntary movements were seen [No Rash] : no rash [Acanthosis Nigricans] : no acanthosis nigricans [No Tremors] : no tremors [Oriented x3] : oriented to person, place, and time [Normal Insight/Judgement] : insight and judgment were intact

## 2022-10-06 NOTE — HISTORY OF PRESENT ILLNESS
[FreeTextEntry1] : This is a 46 yo male with history of thyroid nodules, pituitary microadenoma, who presents for follow up.\par \par At last endocrine visit in July 2022, patient was noted to have low prolactin level, advised to cut down cabergoline to half tablet once weekly. Also noted to have a mild elevation in TSH level, and he was advised to continue to monitor this.  He has been on cabergoline since past 1 year.  He is following with neurosurgery Dr. Boothe and conservative management has been recommended.  Repeat MRI in July 2022 was reviewed and adenoma was noted to be slightly smaller in size.\par Regarding thyroid, patient states he had a total thyroidectomy in [facility] in 2000's.  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 Last thyroid sonogram from March 2022 reported thyroid nodules are stable\par Today patient denies headaches, blurry or double vision, galactorrhea, fatigue or loss of libido. \par has been in cabergolinae for past 1 year, baby 2 months ago. Thinking about to fture kids.

## 2023-01-06 ENCOUNTER — APPOINTMENT (OUTPATIENT)
Dept: ENDOCRINOLOGY | Facility: CLINIC | Age: 48
End: 2023-01-06
Payer: COMMERCIAL

## 2023-01-06 VITALS
SYSTOLIC BLOOD PRESSURE: 144 MMHG | HEIGHT: 66 IN | HEART RATE: 69 BPM | OXYGEN SATURATION: 98 % | BODY MASS INDEX: 36.34 KG/M2 | WEIGHT: 226.13 LBS | DIASTOLIC BLOOD PRESSURE: 88 MMHG

## 2023-01-06 PROCEDURE — 99212 OFFICE O/P EST SF 10 MIN: CPT | Mod: 25

## 2023-01-12 LAB — PROLACTIN SERPL-MCNC: 1.8 NG/ML

## 2023-01-12 NOTE — HISTORY OF PRESENT ILLNESS
[FreeTextEntry1] : This is a 46 yo male with history of thyroid nodules, pituitary microadenoma, who presents for follow up.\par \par At last endocrine visit in Oct 2022, patient was noted to have low prolactin level, advised to cut down cabergoline to half tablet once weekly. ( 0.25mg once weekly) Also noted to have a mild elevation in TSH level, and he was advised to continue to monitor this.   He is following with neurosurgery Dr. Boothe and conservative management has been recommended.  Repeat MRI in July 2022 was reviewed and adenoma was noted to be slightly smaller in size.\par Regarding thyroid, patient states he had a total thyroidectomy in [facility] in 2000's.  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 Last thyroid sonogram from March 2022 reported thyroid nodules are stable. Seeing Dr Serrano in March 2023. \par Today patient denies headaches, blurry or double vision, galactorrhea, fatigue or loss of libido. \par He has been taking cabergoline for past 1 year, had baby 4 months ago. Thinking about to future kids.\par

## 2023-01-12 NOTE — ASSESSMENT
[FreeTextEntry1] : History of pituitary microadenoma and hyperprolactinemia, likely prolactinoma\par Has been on cabergoline since past year with improvement in prolactin levels\par Bloodwork was collected in office today, will f/u results accordingly.\par If prolactin remains low, will do trial off cabergoline, currently on cabergoline 0.25 mg once weekly\par Discussed with patient that if prolactin level is low, will consider to do a trial off cabergoline\par Clinically patient has no complaints today, possibly thinking about future children, has 1 child already.\par \par History of thyroid nodules, history of benign FNA \par Following with ENT regularly, recommended to repeat CT scan at next visit\par Denies compressive symptoms today, biochemically euthyroid, not on thyroid medication\par Repeat thyroid sonogram this year\par \par Answered all questions today; patient verbalized understanding of the above\par RTC in 3 months\par

## 2023-01-12 NOTE — PHYSICAL EXAM
[Alert] : alert [No Acute Distress] : no acute distress [Well Developed] : well developed [Normal Voice/Communication] : normal voice communication [Normal Sclera/Conjunctiva] : normal sclera/conjunctiva [EOMI] : extra ocular movement intact [No Proptosis] : no proptosis [No Lid Lag] : no lid lag [Normal Hearing] : hearing was normal [No LAD] : no lymphadenopathy [Supple] : the neck was supple [No Stigmata of Cushings Syndrome] : no stigmata of Cushings Syndrome [Normal Gait] : normal gait [No Clubbing, Cyanosis] : no clubbing  or cyanosis of the fingernails [No Involuntary Movements] : no involuntary movements were seen [No Rash] : no rash [No Tremors] : no tremors [Oriented x3] : oriented to person, place, and time [Normal Insight/Judgement] : insight and judgment were intact [Acanthosis Nigricans] : no acanthosis nigricans

## 2023-03-01 ENCOUNTER — OUTPATIENT (OUTPATIENT)
Dept: OUTPATIENT SERVICES | Facility: HOSPITAL | Age: 48
LOS: 1 days | End: 2023-03-01
Payer: COMMERCIAL

## 2023-03-01 ENCOUNTER — APPOINTMENT (OUTPATIENT)
Dept: CT IMAGING | Facility: IMAGING CENTER | Age: 48
End: 2023-03-01
Payer: COMMERCIAL

## 2023-03-01 DIAGNOSIS — E04.9 NONTOXIC GOITER, UNSPECIFIED: ICD-10-CM

## 2023-03-01 PROCEDURE — 70491 CT SOFT TISSUE NECK W/DYE: CPT

## 2023-03-01 PROCEDURE — 70491 CT SOFT TISSUE NECK W/DYE: CPT | Mod: 26

## 2023-03-09 ENCOUNTER — APPOINTMENT (OUTPATIENT)
Dept: SURGERY | Facility: CLINIC | Age: 48
End: 2023-03-09
Payer: COMMERCIAL

## 2023-03-09 DIAGNOSIS — Z00.00 ENCOUNTER FOR GENERAL ADULT MEDICAL EXAMINATION W/OUT ABNORMAL FINDINGS: ICD-10-CM

## 2023-03-09 LAB
T3 SERPL-MCNC: 91 NG/DL
T4 FREE SERPL-MCNC: 1.1 NG/DL
THYROGLOB AB SERPL-ACNC: <20 IU/ML
THYROPEROXIDASE AB SERPL IA-ACNC: 10.1 IU/ML
TSH SERPL-ACNC: 1.97 UIU/ML

## 2023-03-09 PROCEDURE — 31575 DIAGNOSTIC LARYNGOSCOPY: CPT

## 2023-03-09 PROCEDURE — 99214 OFFICE O/P EST MOD 30 MIN: CPT | Mod: 25

## 2023-03-09 NOTE — PHYSICAL EXAM
[de-identified] : long well healed scar low transverse  midline [de-identified] : bilateral 4 cm thyroid nodules, well circumscribed and mobile [Laryngoscopy Performed] : laryngoscopy was performed, see procedure section for findings [Midline] : located in midline position [Normal] : orientation to person, place, and time: normal

## 2023-03-09 NOTE — PROCEDURE
[None] : none [Flexible Endoscope] : examined with the flexible endoscope [Mass ___ cm] : no masses on the base of the tongue [Normal] : normal vallecula [Lesion(s)] : no lesions [de-identified] : paretic right vocal cord

## 2023-04-07 ENCOUNTER — APPOINTMENT (OUTPATIENT)
Dept: ENDOCRINOLOGY | Facility: CLINIC | Age: 48
End: 2023-04-07
Payer: COMMERCIAL

## 2023-04-07 VITALS
SYSTOLIC BLOOD PRESSURE: 143 MMHG | BODY MASS INDEX: 34.87 KG/M2 | HEART RATE: 68 BPM | HEIGHT: 66 IN | OXYGEN SATURATION: 100 % | DIASTOLIC BLOOD PRESSURE: 83 MMHG | WEIGHT: 217 LBS

## 2023-04-07 PROCEDURE — 36415 COLL VENOUS BLD VENIPUNCTURE: CPT

## 2023-04-07 PROCEDURE — 99213 OFFICE O/P EST LOW 20 MIN: CPT | Mod: 25

## 2023-04-12 LAB — PROLACTIN SERPL-MCNC: 1.6 NG/ML

## 2023-04-17 NOTE — HISTORY OF PRESENT ILLNESS
[FreeTextEntry1] : This is a 47 yo male with history of thyroid nodules, pituitary microadenoma, who presents for follow up.\par \par At last endocrine visit in Jan 2023, patient was noted to have low prolactin level, advised to cut down cabergoline to half tablet once weekly. ( 0.25mg once weekly) Also noted to have a mild elevation in TSH level, and he was advised to continue to monitor this.   He is following with neurosurgery Dr. Boothe and conservative management has been recommended.  Repeat MRI in July 2022 was reviewed and adenoma was noted to be slightly smaller in size.\par Regarding thyroid, patient states he had a total thyroidectomy in [facility] in 2000's, however likely partial thyroidectomy was done .  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 Last thyroid sonogram from March 2022 reported thyroid nodules are stable. He saw Dr Serrano in March 2023 and advised to do CT at next visit and continue to monitor thyroid nodules\par \par Today patient denies headaches, blurry or double vision, galactorrhea, fatigue or loss of libido. \par

## 2023-04-17 NOTE — PHYSICAL EXAM
[Alert] : alert [No Acute Distress] : no acute distress [Well Developed] : well developed [Normal Voice/Communication] : normal voice communication [Normal Sclera/Conjunctiva] : normal sclera/conjunctiva [EOMI] : extra ocular movement intact [No Proptosis] : no proptosis [No Lid Lag] : no lid lag [No LAD] : no lymphadenopathy [Normal Hearing] : hearing was normal [Supple] : the neck was supple [No Stigmata of Cushings Syndrome] : no stigmata of Cushings Syndrome [Normal Gait] : normal gait [No Clubbing, Cyanosis] : no clubbing  or cyanosis of the fingernails [No Involuntary Movements] : no involuntary movements were seen [No Rash] : no rash [No Tremors] : no tremors [Oriented x3] : oriented to person, place, and time [Normal Insight/Judgement] : insight and judgment were intact [Acanthosis Nigricans] : no acanthosis nigricans

## 2023-04-17 NOTE — ASSESSMENT
[FreeTextEntry1] : History of pituitary microadenoma and hyperprolactinemia, likely prolactinoma\par Has been on cabergoline since past year with improvement in prolactin levels\par Bloodwork was collected in office today, will f/u results accordingly.\par Discussed with patient that if prolactin level is low, will consider to do a trial off cabergoline\par Clinically patient has no complaints today, possibly thinking about future children, has 1 child already.\par \par Addendum: noted prolactin level low, advised to do trial off cabergoline medication. pt verbalized understanding\par \par History of thyroid nodules, history of benign FNA \par Following with ENT regularly, recommended to repeat CT scan at next visit\par Denies compressive symptoms today, biochemically and clinically euthyroid, not on thyroid medication\par \par \par Answered all questions today; patient verbalized understanding of the above\par RTC in 6 months\par

## 2023-10-13 ENCOUNTER — APPOINTMENT (OUTPATIENT)
Dept: ENDOCRINOLOGY | Facility: CLINIC | Age: 48
End: 2023-10-13
Payer: COMMERCIAL

## 2023-10-13 VITALS
HEIGHT: 66 IN | BODY MASS INDEX: 37.49 KG/M2 | HEART RATE: 64 BPM | WEIGHT: 233.25 LBS | OXYGEN SATURATION: 97 % | DIASTOLIC BLOOD PRESSURE: 90 MMHG | SYSTOLIC BLOOD PRESSURE: 146 MMHG

## 2023-10-13 DIAGNOSIS — D35.2 BENIGN NEOPLASM OF PITUITARY GLAND: ICD-10-CM

## 2023-10-13 PROCEDURE — 36415 COLL VENOUS BLD VENIPUNCTURE: CPT

## 2023-10-16 LAB
PROLACTIN SERPL-MCNC: 376 NG/ML
T4 FREE SERPL-MCNC: 0.8 NG/DL
TSH SERPL-ACNC: 2.96 UIU/ML

## 2023-10-16 RX ORDER — CABERGOLINE 0.5 MG/1
0.5 TABLET ORAL
Qty: 12 | Refills: 1 | Status: ACTIVE | COMMUNITY
Start: 2021-08-20 | End: 1900-01-01

## 2024-02-16 ENCOUNTER — APPOINTMENT (OUTPATIENT)
Dept: CT IMAGING | Facility: CLINIC | Age: 49
End: 2024-02-16

## 2024-03-04 ENCOUNTER — APPOINTMENT (OUTPATIENT)
Dept: CT IMAGING | Facility: IMAGING CENTER | Age: 49
End: 2024-03-04
Payer: COMMERCIAL

## 2024-03-04 ENCOUNTER — OUTPATIENT (OUTPATIENT)
Dept: OUTPATIENT SERVICES | Facility: HOSPITAL | Age: 49
LOS: 1 days | End: 2024-03-04
Payer: COMMERCIAL

## 2024-03-04 DIAGNOSIS — E04.9 NONTOXIC GOITER, UNSPECIFIED: ICD-10-CM

## 2024-03-04 DIAGNOSIS — Z00.8 ENCOUNTER FOR OTHER GENERAL EXAMINATION: ICD-10-CM

## 2024-03-04 PROCEDURE — 70491 CT SOFT TISSUE NECK W/DYE: CPT | Mod: 26

## 2024-03-04 PROCEDURE — 70491 CT SOFT TISSUE NECK W/DYE: CPT

## 2024-03-07 ENCOUNTER — APPOINTMENT (OUTPATIENT)
Dept: SURGERY | Facility: CLINIC | Age: 49
End: 2024-03-07
Payer: COMMERCIAL

## 2024-03-07 DIAGNOSIS — E04.9 NONTOXIC GOITER, UNSPECIFIED: ICD-10-CM

## 2024-03-07 PROCEDURE — 99214 OFFICE O/P EST MOD 30 MIN: CPT | Mod: 25

## 2024-03-07 NOTE — PHYSICAL EXAM
[de-identified] : long well healed scar low transverse  midline [de-identified] : bilateral 4 cm thyroid nodules, well circumscribed and mobile [Laryngoscopy Performed] : laryngoscopy was performed, see procedure section for findings [Midline] : located in midline position [Normal] : orientation to person, place, and time: normal

## 2024-03-07 NOTE — HISTORY OF PRESENT ILLNESS
[de-identified] : s/p partial  thyroidectomy elsewhere. prior evaluation of thyroid nodules. cytologically benign. denies dysphagia, hoarseness or new lesions.  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 recent CT  stable. no changes medically since last visit. I have reviewed all old and new data and available images.

## 2024-03-07 NOTE — PROCEDURE
[None] : none [Normal] : normal base of the tongue [Mass ___ cm] : no masses on the base of the tongue [Lesion(s)] : no lesions [de-identified] : paretic right vocal cord

## 2024-03-11 LAB
T3 SERPL-MCNC: 119 NG/DL
T4 FREE SERPL-MCNC: 1.2 NG/DL
THYROGLOB AB SERPL-ACNC: <20 IU/ML
THYROPEROXIDASE AB SERPL IA-ACNC: <10 IU/ML
TSH SERPL-ACNC: 2.03 UIU/ML

## 2024-04-05 ENCOUNTER — APPOINTMENT (OUTPATIENT)
Dept: ENDOCRINOLOGY | Facility: CLINIC | Age: 49
End: 2024-04-05

## 2025-03-04 DIAGNOSIS — D35.2 BENIGN NEOPLASM OF PITUITARY GLAND: ICD-10-CM

## 2025-03-04 DIAGNOSIS — E04.9 NONTOXIC GOITER, UNSPECIFIED: ICD-10-CM

## 2025-03-19 ENCOUNTER — APPOINTMENT (OUTPATIENT)
Dept: CT IMAGING | Facility: CLINIC | Age: 50
End: 2025-03-19
Payer: COMMERCIAL

## 2025-03-19 PROCEDURE — 70491 CT SOFT TISSUE NECK W/DYE: CPT

## 2025-03-24 ENCOUNTER — NON-APPOINTMENT (OUTPATIENT)
Age: 50
End: 2025-03-24

## 2025-07-24 ENCOUNTER — APPOINTMENT (OUTPATIENT)
Dept: SURGERY | Facility: CLINIC | Age: 50
End: 2025-07-24
Payer: COMMERCIAL

## 2025-07-24 VITALS — BODY MASS INDEX: 35.84 KG/M2 | WEIGHT: 223 LBS | HEIGHT: 66 IN

## 2025-07-24 DIAGNOSIS — D35.2 BENIGN NEOPLASM OF PITUITARY GLAND: ICD-10-CM

## 2025-07-24 DIAGNOSIS — E04.9 NONTOXIC GOITER, UNSPECIFIED: ICD-10-CM

## 2025-07-24 PROCEDURE — 99214 OFFICE O/P EST MOD 30 MIN: CPT | Mod: 25

## 2025-07-25 LAB
T3 SERPL-MCNC: 85 NG/DL
T4 FREE SERPL-MCNC: 0.9 NG/DL
THYROGLOB AB SERPL-ACNC: 17.3 IU/ML
THYROPEROXIDASE AB SERPL IA-ACNC: 10 IU/ML
TSH SERPL-ACNC: 1.74 UIU/ML

## 2025-09-08 ENCOUNTER — APPOINTMENT (OUTPATIENT)
Dept: ENDOCRINOLOGY | Facility: CLINIC | Age: 50
End: 2025-09-08

## 2025-09-10 ENCOUNTER — APPOINTMENT (OUTPATIENT)
Dept: ENDOCRINOLOGY | Facility: CLINIC | Age: 50
End: 2025-09-10